# Patient Record
Sex: FEMALE
[De-identification: names, ages, dates, MRNs, and addresses within clinical notes are randomized per-mention and may not be internally consistent; named-entity substitution may affect disease eponyms.]

---

## 2019-09-09 ENCOUNTER — HOSPITAL ENCOUNTER (EMERGENCY)
Dept: HOSPITAL 92 - ERS | Age: 64
Discharge: HOME | End: 2019-09-09
Payer: SELF-PAY

## 2019-09-09 DIAGNOSIS — J34.89: Primary | ICD-10-CM
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
## 2019-09-09 DIAGNOSIS — E78.5: ICD-10-CM

## 2019-09-09 DIAGNOSIS — E78.00: ICD-10-CM

## 2019-09-09 PROCEDURE — 70486 CT MAXILLOFACIAL W/O DYE: CPT
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
